# Patient Record
Sex: MALE | Race: WHITE | NOT HISPANIC OR LATINO | URBAN - METROPOLITAN AREA
[De-identification: names, ages, dates, MRNs, and addresses within clinical notes are randomized per-mention and may not be internally consistent; named-entity substitution may affect disease eponyms.]

---

## 2020-03-31 ENCOUNTER — EMERGENCY (EMERGENCY)
Facility: HOSPITAL | Age: 45
LOS: 0 days | Discharge: HOME | End: 2020-03-31
Attending: EMERGENCY MEDICINE
Payer: COMMERCIAL

## 2020-03-31 VITALS
SYSTOLIC BLOOD PRESSURE: 147 MMHG | RESPIRATION RATE: 18 BRPM | DIASTOLIC BLOOD PRESSURE: 97 MMHG | TEMPERATURE: 98 F | OXYGEN SATURATION: 100 % | HEART RATE: 103 BPM

## 2020-03-31 VITALS
HEART RATE: 59 BPM | SYSTOLIC BLOOD PRESSURE: 125 MMHG | DIASTOLIC BLOOD PRESSURE: 72 MMHG | OXYGEN SATURATION: 99 % | TEMPERATURE: 97 F | RESPIRATION RATE: 18 BRPM
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
## 2020-03-31 DIAGNOSIS — S61.532A PUNCTURE WOUND WITHOUT FOREIGN BODY OF LEFT WRIST, INITIAL ENCOUNTER: ICD-10-CM

## 2020-03-31 DIAGNOSIS — S69.92XA UNSPECIFIED INJURY OF LEFT WRIST, HAND AND FINGER(S), INITIAL ENCOUNTER: ICD-10-CM

## 2020-03-31 LAB
ANION GAP SERPL CALC-SCNC: 4 MMOL/L — LOW (ref 5–17)
BASOPHILS # BLD AUTO: 0.04 K/UL — SIGNIFICANT CHANGE UP (ref 0–0.2)
BASOPHILS NFR BLD AUTO: 0.8 % — SIGNIFICANT CHANGE UP (ref 0–2)
BUN SERPL-MCNC: 18 MG/DL — SIGNIFICANT CHANGE UP (ref 7–23)
CALCIUM SERPL-MCNC: 8.9 MG/DL — SIGNIFICANT CHANGE UP (ref 8.5–10.1)
CHLORIDE SERPL-SCNC: 103 MMOL/L — SIGNIFICANT CHANGE UP (ref 96–108)
CO2 SERPL-SCNC: 31 MMOL/L — SIGNIFICANT CHANGE UP (ref 22–31)
CREAT SERPL-MCNC: 1.09 MG/DL — SIGNIFICANT CHANGE UP (ref 0.5–1.3)
EOSINOPHIL # BLD AUTO: 0.09 K/UL — SIGNIFICANT CHANGE UP (ref 0–0.5)
EOSINOPHIL NFR BLD AUTO: 1.9 % — SIGNIFICANT CHANGE UP (ref 0–6)
GLUCOSE SERPL-MCNC: 118 MG/DL — HIGH (ref 70–99)
HCT VFR BLD CALC: 44.5 % — SIGNIFICANT CHANGE UP (ref 39–50)
HGB BLD-MCNC: 15.3 G/DL — SIGNIFICANT CHANGE UP (ref 13–17)
IMM GRANULOCYTES NFR BLD AUTO: 0.6 % — SIGNIFICANT CHANGE UP (ref 0–1.5)
LYMPHOCYTES # BLD AUTO: 1.86 K/UL — SIGNIFICANT CHANGE UP (ref 1–3.3)
LYMPHOCYTES # BLD AUTO: 38.4 % — SIGNIFICANT CHANGE UP (ref 13–44)
MCHC RBC-ENTMCNC: 31.2 PG — SIGNIFICANT CHANGE UP (ref 27–34)
MCHC RBC-ENTMCNC: 34.4 GM/DL — SIGNIFICANT CHANGE UP (ref 32–36)
MCV RBC AUTO: 90.6 FL — SIGNIFICANT CHANGE UP (ref 80–100)
MONOCYTES # BLD AUTO: 0.46 K/UL — SIGNIFICANT CHANGE UP (ref 0–0.9)
MONOCYTES NFR BLD AUTO: 9.5 % — SIGNIFICANT CHANGE UP (ref 2–14)
NEUTROPHILS # BLD AUTO: 2.36 K/UL — SIGNIFICANT CHANGE UP (ref 1.8–7.4)
NEUTROPHILS NFR BLD AUTO: 48.8 % — SIGNIFICANT CHANGE UP (ref 43–77)
PLATELET # BLD AUTO: 209 K/UL — SIGNIFICANT CHANGE UP (ref 150–400)
POTASSIUM SERPL-MCNC: 4 MMOL/L — SIGNIFICANT CHANGE UP (ref 3.5–5.3)
POTASSIUM SERPL-SCNC: 4 MMOL/L — SIGNIFICANT CHANGE UP (ref 3.5–5.3)
RBC # BLD: 4.91 M/UL — SIGNIFICANT CHANGE UP (ref 4.2–5.8)
RBC # FLD: 12.1 % — SIGNIFICANT CHANGE UP (ref 10.3–14.5)
SODIUM SERPL-SCNC: 138 MMOL/L — SIGNIFICANT CHANGE UP (ref 135–145)
WBC # BLD: 4.84 K/UL — SIGNIFICANT CHANGE UP (ref 3.8–10.5)
WBC # FLD AUTO: 4.84 K/UL — SIGNIFICANT CHANGE UP (ref 3.8–10.5)

## 2020-03-31 PROCEDURE — 36415 COLL VENOUS BLD VENIPUNCTURE: CPT

## 2020-03-31 PROCEDURE — 80048 BASIC METABOLIC PNL TOTAL CA: CPT

## 2020-03-31 PROCEDURE — 99243 OFF/OP CNSLTJ NEW/EST LOW 30: CPT

## 2020-03-31 PROCEDURE — 96374 THER/PROPH/DIAG INJ IV PUSH: CPT | Mod: XU

## 2020-03-31 PROCEDURE — 99285 EMERGENCY DEPT VISIT HI MDM: CPT

## 2020-03-31 PROCEDURE — 85025 COMPLETE CBC W/AUTO DIFF WBC: CPT

## 2020-03-31 PROCEDURE — 90715 TDAP VACCINE 7 YRS/> IM: CPT

## 2020-03-31 PROCEDURE — 90471 IMMUNIZATION ADMIN: CPT | Mod: XU

## 2020-03-31 PROCEDURE — 73206 CT ANGIO UPR EXTRM W/O&W/DYE: CPT | Mod: LT

## 2020-03-31 PROCEDURE — 73206 CT ANGIO UPR EXTRM W/O&W/DYE: CPT | Mod: 26,LT

## 2020-03-31 PROCEDURE — 99284 EMERGENCY DEPT VISIT MOD MDM: CPT | Mod: 25

## 2020-03-31 RX ORDER — ASPIRIN/CALCIUM CARB/MAGNESIUM 324 MG
325 TABLET ORAL ONCE
Refills: 0 | Status: COMPLETED | OUTPATIENT
Start: 2020-03-31 | End: 2020-03-31

## 2020-03-31 RX ORDER — CEFAZOLIN SODIUM 1 G
1000 VIAL (EA) INJECTION ONCE
Refills: 0 | Status: COMPLETED | OUTPATIENT
Start: 2020-03-31 | End: 2020-03-31

## 2020-03-31 RX ORDER — CEPHALEXIN 500 MG
1 CAPSULE ORAL
Qty: 20 | Refills: 0
Start: 2020-03-31 | End: 2020-04-09

## 2020-03-31 RX ORDER — TETANUS TOXOID, REDUCED DIPHTHERIA TOXOID AND ACELLULAR PERTUSSIS VACCINE, ADSORBED 5; 2.5; 8; 8; 2.5 [IU]/.5ML; [IU]/.5ML; UG/.5ML; UG/.5ML; UG/.5ML
0.5 SUSPENSION INTRAMUSCULAR ONCE
Refills: 0 | Status: COMPLETED | OUTPATIENT
Start: 2020-03-31 | End: 2020-03-31

## 2020-03-31 RX ORDER — ASPIRIN/CALCIUM CARB/MAGNESIUM 324 MG
1 TABLET ORAL
Qty: 30 | Refills: 0
Start: 2020-03-31 | End: 2020-04-29

## 2020-03-31 RX ORDER — CEFAZOLIN SODIUM 1 G
1000 VIAL (EA) INJECTION ONCE
Refills: 0 | Status: DISCONTINUED | OUTPATIENT
Start: 2020-03-31 | End: 2020-03-31

## 2020-03-31 RX ORDER — ACETAMINOPHEN 500 MG
1000 TABLET ORAL ONCE
Refills: 0 | Status: COMPLETED | OUTPATIENT
Start: 2020-03-31 | End: 2020-03-31

## 2020-03-31 RX ADMIN — Medication 1000 MILLIGRAM(S): at 10:49

## 2020-03-31 RX ADMIN — Medication 1000 MILLIGRAM(S): at 08:58

## 2020-03-31 RX ADMIN — TETANUS TOXOID, REDUCED DIPHTHERIA TOXOID AND ACELLULAR PERTUSSIS VACCINE, ADSORBED 0.5 MILLILITER(S): 5; 2.5; 8; 8; 2.5 SUSPENSION INTRAMUSCULAR at 08:59

## 2020-03-31 RX ADMIN — Medication 325 MILLIGRAM(S): at 12:25

## 2020-03-31 NOTE — CONSULT NOTE ADULT - ASSESSMENT
A/P:   44y[facility] w/ penetrating injury to left wrist w/ hematoma & possible ulnar artery injury    -No plan for acute orthopaedic surgical intervention indicated at this time.   -Recommend tetanus vaccine update  -IV abx  -Recommend monitoring compartments/neurovascular status for 2-3 hrs in ED prior to discharge. Will reeval prior to discharge. Further recommendations to follow pending reeval.  -Pt advised to remove all jewelry from affected limb.  -Analgesia prn as tolerated  -Strict Ice/Elevation to help with swelling  -NWB LUE with splint and sling  -Keep splint clean/dry/intact  -Encourage active finger motion to help with swelling  -Pt aware of possible need for surgical intervention of ulnar artery and/or exploration of volar compartments. Will FU as outpatient.  -Pt made aware of signs and symptoms of compartment syndrome. Aware of need to return to notify physician or return to ED if symptoms develop.  -Recommend follow up outpatient w/ Dr. Jones in 2-3 days. Call office to schedule appointment.  -All Patient's questions answered. Patient understands and agrees w/ plan.  -Imaging and clinical presentation discussed w/ Dr. Jones who is aware and agrees w/ above plan. A/P:   44yMale s/p [facility] Fall w/ penetrating injury to left wrist w/ hematoma & possible ulnar artery injury    -No plan for acute orthopaedic surgical intervention indicated at this time.   -Recommend tetanus vaccine update  -IV abx in ED now. Recommend discharge w/ PO keflex x 7days.  -Recommend monitoring compartments/neurovascular status for 2-3 hrs in ED prior to discharge. Will reeval prior to discharge. Further recommendations to follow pending reeval.  -Pt advised to remove all jewelry from affected limb.  -Analgesia prn as tolerated  -Strict Ice/Elevation to help with swelling  -NWB LUE with splint and sling  -Keep splint clean/dry/intact  -Encourage active finger motion to help with swelling  -Pt aware of possible need for surgical intervention of ulnar artery and/or exploration of volar compartments. Will FU as outpatient.  -Pt made aware of signs and symptoms of compartment syndrome. Aware of need to return to notify physician or return to ED if symptoms develop.  -Recommend follow up outpatient w/ Dr. Jones in 2-3 days. Call office to schedule appointment.  -All Patient's questions answered. Patient understands and agrees w/ plan.  -Imaging and clinical presentation discussed w/ Dr. Jones who is aware and agrees w/ above plan.

## 2020-03-31 NOTE — CONSULT NOTE ADULT - ASSESSMENT
43 yo male s/p penetrating injury to the left wrist with possible spasm to the left ulnar artery    -Doppler ulnar signals and palpable radial pulse        Discussed with vascular attending, Dr. Pérez 43 yo male s/p penetrating injury to the left wrist with possible spasm to the left ulnar artery    -Doppler ulnar signals and palpable radial pulse  -Follow up as outpatient with Dr. Pérez	  -Daily ASA 81 for 1 month        Discussed with vascular attending, Dr. Pérez

## 2020-03-31 NOTE — ED ADULT NURSE NOTE - NURSING MUSC HAND GRASP
strong right, weak left/limitation related to injury in left wrist/hand with general strength intact

## 2020-03-31 NOTE — ED ADULT NURSE NOTE - OBJECTIVE STATEMENT
Pt reports that he experienced a left wrist puncture wound with worsening edema and hand/wrist pain. Edema and redness noted to wrist and hand with some limitation to hand/finger movement related to pain. + radial pulse  Pt medicated as ordered and awaiting results of cta at this time.

## 2020-03-31 NOTE — ED PROVIDER NOTE - OBJECTIVE STATEMENT
44 year old male with no pmh who presents with injury to left wrist. Pt notes he was working and had a mati metal bar puncture left wrist. Injury is to ulnar aspect of volar wrist. + swelling and pain wti wrist paining ist painful to move fingers. No weakness or numbness in hand or fingers. No other injuries. Unknown last tdap. No other complaints.

## 2020-03-31 NOTE — CONSULT NOTE ADULT - SUBJECTIVE AND OBJECTIVE BOX
44y RHD Male patient presents to [facility] ED c/o severe L wrist pain s/p penetrating injury. 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 Patient denies any other injuries/falls/head hit/LOC. Localizing pain to forearm and wrist. Denies radiation of pain. Pt denies numbness, tingling or burning. Patient denies any other injuries. Of note reports last tetanus vaccine was over 10 yrs ago.    PMH:  No pertinent past medical history    PSH:  No significant past surgical history    AH:    Meds: See med rec    T(C): 36.3 (03-31-20 @ 11:01)  HR: 59 (03-31-20 @ 11:01)  BP: 125/72 (03-31-20 @ 11:01)  RR: 18 (03-31-20 @ 11:01)  SpO2: 99% (03-31-20 @ 11:01)  Wt(kg): --    PE :  Gen: NAD, A/Ox3, Following commands  LUE  Small penetrating injury over the volar-ulnar aspect of the distal forearm, proximal to the joint. +Soft tissue swelling, no ecchymosis  Decreased ROM of Wrist/fingers due to pain. Fingers resting in slight flexion  + pain to passive stretch of fingers  Normal Elbow/Shoulder ROM w/o pain  + TTP about the volar forearm  No Snuff box TTP  + Rad Pulse, brisk capillary refill  SILT C5-T1  +AIN/PIN/Ulnar/Radial/Musc/Median/Ax  compartments soft and compressible    Secondary Survey:   No TTP over bony prominences, SILT, palpable pulses, full/painless A/PROM, compartments soft. No TTP over spinous processes or paraspinal muscles at C/T/L spine. No palpable step off. No other injuries or complaints. Able to SLR BL. Negative logroll/heelstrike BL. Ambulating w/o assistance/pain.    Imaging:  CT angio LUE demonstrates hematoma and segmental filling defect of the ulnar artery, no appreciable fractures/dislocations.    Procedure Note:  After verbal consent obtained, a well padded volar splint was applied to Forearm/Wrist. Post reduction NV exam unchanged. Pt tolerated procedure well. 44y RHD Male patient presents to [place] ED c/o moderate L wrist pain s/p penetrating injury. 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 Patient denies any other injuries/falls/head hit/LOC. Localizing pain to forearm and wrist. Denies radiation of pain. Pt denies numbness, tingling or burning. Patient denies any other injuries. Of note reports last tetanus vaccine was over 10 yrs ago.    PMH:  No pertinent past medical history    PSH:  No significant past surgical history    AH:    Meds: See med rec    T(C): 36.3 (03-31-20 @ 11:01)  HR: 59 (03-31-20 @ 11:01)  BP: 125/72 (03-31-20 @ 11:01)  RR: 18 (03-31-20 @ 11:01)  SpO2: 99% (03-31-20 @ 11:01)  Wt(kg): --    PE :  Gen: NAD, A/Ox3, Following commands  LUE  Small penetrating injury over the volar-ulnar aspect of the distal forearm, proximal to the joint. +Soft tissue swelling, no ecchymosis  Decreased ROM of Wrist/fingers due to pain. Fingers resting in slight flexion  + pain to passive stretch of fingers  Normal Elbow/Shoulder ROM w/o pain  + TTP about the volar forearm  No Snuff box TTP  + Rad Pulse, brisk capillary refill  SILT C5-T1  +AIN/PIN/Ulnar/Radial/Musc/Median/Ax  compartments soft and compressible    Secondary Survey:   No TTP over bony prominences, SILT, palpable pulses, full/painless A/PROM, compartments soft. No TTP over spinous processes or paraspinal muscles at C/T/L spine. No palpable step off. No other injuries or complaints. Able to SLR BL. Negative logroll/heelstrike BL. Ambulating w/o assistance/pain.    Imaging:  CT angio LUE demonstrates hematoma and segmental filling defect of the ulnar artery, no appreciable fractures/dislocations.    Procedure Note:  After verbal consent obtained, a well padded volar splint was applied to Forearm/Wrist. Post reduction NV exam unchanged. Pt tolerated procedure well.

## 2020-03-31 NOTE — CONSULT NOTE ADULT - SUBJECTIVE AND OBJECTIVE BOX
43 yo male with a penetrating injury with a wire to the left distal forearm 1.5cm proxima to the wrist. Patient with pain in the forearm and swelling. Pain with motion of the hand. Sensation remains intact.     PAST MEDICAL & SURGICAL HISTORY:  No pertinent past medical history  No significant past surgical history    Vital Signs Last 24 Hrs  T(C): 36.3 (31 Mar 2020 11:01), Max: 36.6 (31 Mar 2020 08:15)  T(F): 97.4 (31 Mar 2020 11:01), Max: 97.9 (31 Mar 2020 08:15)  HR: 59 (31 Mar 2020 11:01) (59 - 103)  BP: 125/72 (31 Mar 2020 11:01) (125/72 - 147/97)  BP(mean): 99 (31 Mar 2020 08:15) (99 - 99)  RR: 18 (31 Mar 2020 11:01) (18 - 18)  SpO2: 99% (31 Mar 2020 11:01) (99% - 100%)    Exam:  Gen: AAOx3 in NAD  Cardio: S1/S2  Resp: Non-labored on RA  Vasc: Palpable radial pulses, doppler left ulnar signal, good capillary refill                          15.3   4.84  )-----------( 209      ( 31 Mar 2020 08:52 )             44.5     Imaging:  < from: CT Angio Upper Extremity w/ IV Cont, Left (03.31.20 @ 08:59) >  Findings: The radial and interosseous arteries appear intact. There is a subcentimeter filling defect within the ulnar artery in the distal forearm just proximal to the wrist, best depicted on coronal MIP image 603, 38. Collateral branches are identified inthe vicinity of the defect, and arterial supply to the wrist appears preserved with intact deep and superficial palmar arches. No contrast extravasation or pseudoaneurysm is seen. The radial and ulnar veins are patent and no intraluminal thrombus is seen.    There is a hematoma within the fascial plane between the flexor musculature on the volar surface of the forearm immediately deep to the ulnar artery, extending most of the length of the forearm and reaching to the flexor tendon sheath at the wrist. No muscular or tendinous injury is apparent. No acute bony injury is seen.    Impression: Segmental filling defect in the ulnar artery in the distal left forearm may represent intimal injury and/or thrombosis. No evidence of pseudoaneurysm or active hemorrhage.    Intermuscular hematoma in the forearm.    < end of copied text >

## 2020-03-31 NOTE — ED ADULT TRIAGE NOTE - CHIEF COMPLAINT QUOTE
puncture wound to left inner wrist from mati metal. hand looks dusky upon arrival. pt describes decreased sensation/movement.

## 2020-03-31 NOTE — ED ADULT NURSE REASSESSMENT NOTE - NS ED NURSE REASSESS COMMENT FT1
After pt was cleared from ortho, pt was verbally told that he was going to be discharged and told a staff member and ED that he was "leaving right now, I'm an adult."  Md aware that pt was not able to get d/c paperwork.  No known staff member removed IVL, therefore attempt made to contact patient with only contact number on file (emergency contact.)  Emergency contact provided a contact number for patient  [phone].  Pt states that he removed IVL with catheter intact.  MD notified.

## 2020-03-31 NOTE — ED PROVIDER NOTE - PATIENT PORTAL LINK FT
You can access the FollowMyHealth Patient Portal offered by [facility] by registering at the following website: [email or web address]. By joining [employer]’s FollowMyHealth portal, you will also be able to view your health information using other applications (apps) compatible with our system.

## 2020-03-31 NOTE — ED PROVIDER NOTE - PROGRESS NOTE DETAILS
AJM: ct findings noted. vascular and hand surgery are at bedside to examine pt. case discussed with suad dominguez and anderson AJM: pt examined by ortho. they are not concerned for compartment syndrome 2/2 swelling. happy to follow up in office today or tomorrow. awaiting vascular attending sofia AJM: pt feeling well. splinted and irrigated by ortho. abx given and rx sent. re-exam shows cap refll < 2 sec, normal sensation. pt cleared for dc with ortho follow up with dr stover. pt refused to wait for paperwork, therefor no publication of dc papers, pt but verbalized understanding of plan.

## 2020-03-31 NOTE — ED PROVIDER NOTE - MUSCULOSKELETAL, MLM
LEFT WRIST: swelling and ttp over volar wrist, + puncture wound over  ulnar artery. cap refill < 2 sec in all fingers. Sensation and motor intact in all fingers. Bleeding controlled.

## 2020-03-31 NOTE — ED PROVIDER NOTE - CLINICAL SUMMARY MEDICAL DECISION MAKING FREE TEXT BOX
pt with puncture wound to wrist. cap refill intact. strong radial pulse. concern for possible ulnar artery injury. will give tdap, abx, cta WILLIAME

## 2020-03-31 NOTE — CONSULT NOTE ADULT - ATTENDING COMMENTS
Patient evaluated at the bedside. No evidence of hand ischemia. Good signal along Ulnar artery with a strong palpable RA pulse. Hand is warm without pain on passive extension. No neuro motor symptoms.  Hematoma in mid upper forearm ( not at the site of penetrating injury-wrist level) that may be compressing the artery causing CTA imaging. Patient to be discharged home with clear instruction to come back to the ER if he develops hand symptoms or if there are obvious signs of infection.